# Patient Record
Sex: MALE | Race: WHITE | NOT HISPANIC OR LATINO | Employment: FULL TIME | URBAN - NONMETROPOLITAN AREA
[De-identification: names, ages, dates, MRNs, and addresses within clinical notes are randomized per-mention and may not be internally consistent; named-entity substitution may affect disease eponyms.]

---

## 2023-02-14 ENCOUNTER — OFFICE VISIT (OUTPATIENT)
Dept: NEUROLOGY | Facility: OTHER | Age: 48
End: 2023-02-14
Attending: PSYCHIATRY & NEUROLOGY
Payer: COMMERCIAL

## 2023-02-14 VITALS
RESPIRATION RATE: 18 BRPM | SYSTOLIC BLOOD PRESSURE: 128 MMHG | TEMPERATURE: 97.6 F | HEART RATE: 69 BPM | DIASTOLIC BLOOD PRESSURE: 70 MMHG | WEIGHT: 194.5 LBS | OXYGEN SATURATION: 97 %

## 2023-02-14 DIAGNOSIS — R20.0 HAND NUMBNESS: Primary | ICD-10-CM

## 2023-02-14 DIAGNOSIS — R29.898 RIGHT HAND WEAKNESS: ICD-10-CM

## 2023-02-14 PROCEDURE — G0463 HOSPITAL OUTPT CLINIC VISIT: HCPCS

## 2023-02-14 PROCEDURE — G0463 HOSPITAL OUTPT CLINIC VISIT: HCPCS | Mod: 25

## 2023-02-14 PROCEDURE — 99203 OFFICE O/P NEW LOW 30 MIN: CPT | Mod: 25 | Performed by: PSYCHIATRY & NEUROLOGY

## 2023-02-14 PROCEDURE — 95912 NRV CNDJ TEST 11-12 STUDIES: CPT | Mod: 26 | Performed by: PSYCHIATRY & NEUROLOGY

## 2023-02-14 PROCEDURE — 95886 MUSC TEST DONE W/N TEST COMP: CPT | Mod: 26 | Performed by: PSYCHIATRY & NEUROLOGY

## 2023-02-14 PROCEDURE — 95912 NRV CNDJ TEST 11-12 STUDIES: CPT | Performed by: PSYCHIATRY & NEUROLOGY

## 2023-02-14 PROCEDURE — 95886 MUSC TEST DONE W/N TEST COMP: CPT | Performed by: PSYCHIATRY & NEUROLOGY

## 2023-02-14 RX ORDER — ACETAMINOPHEN 325 MG/1
650 TABLET ORAL
COMMUNITY
Start: 2021-05-26

## 2023-02-14 RX ORDER — IBUPROFEN 600 MG/1
600 TABLET, FILM COATED ORAL
COMMUNITY
Start: 2021-04-22

## 2023-02-14 ASSESSMENT — PAIN SCALES - GENERAL: PAINLEVEL: EXTREME PAIN (8)

## 2023-02-14 NOTE — NURSING NOTE
Patient presents to clinic experiencing right elbow pain which radiates down arm into hand.    Medication Rec Complete  Sweetie Lucia LPN............2/14/2023 1:26 PM

## 2023-02-14 NOTE — LETTER
2/14/2023         RE: Cory Meeks  [address] [address]        Dear Colleague,    Thank you for referring your patient, Cory Meeks, to the [facility] [place]. Please see a copy of my visit note below.    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  He relates a lengthy history of increasing problems with severe paresthesia and weakness affecting the right hand and forearm.  He thinks he is losing strength in the hand.  His symptoms are said to be aggravated by his work, which primarily involves driving.    PAST MEDICAL HISTORY:  Seems to be unremarkable.  He had surgery at the right medial epicondyle some years ago.  He has generally been healthy.    REVIEW OF SYSTEMS:  Ten-system review of systems, other than the above, is unremarkable.    PHYSICAL EXAMINATION:  The patient is a healthy-appearing, middle-aged man.  He is 68.5 inches tall and weighs 195 pounds.  Blood pressure is 128/70.  The gait is normal.  On best effort strength is 5/5 bilaterally for the intrinsic hand muscles, finger extensors and flexors, biceps and triceps.  Reflexes are 1/4 bilaterally for the biceps, triceps, and brachioradialis tendons.  Pinprick is intact in the cervical dermatomes of the upper extremities.    NERVE CONDUCTION STUDIES:  Motor nerve conduction testing was performed for the right median, ulnar, and radial nerves.  Distal latencies, amplitudes, conduction velocities, and H reflex latencies were normal throughout.    Orthodromic mixed conduction studies were performed for the right median and ulnar nerves.  Antidromic sensory nerve conduction studies were performed for the second digital, fifth digital, and radial nerves.  Latencies, amplitudes, and conduction velocities were normal.    MONOPOLAR EMG NEEDLE EXAMINATION:  Monopolar EMG needle examination was performed  for the right first dorsal interosseous, extensor digitorum communis, flexor carpi radialis, triceps, and brachioradialis.  All of the tested muscles showed normal insertional activity.  Motor units were normal in size with normal recruitment and interference.    IMPRESSION:  The patient is neurologically healthy with respect to the right upper extremity.  There is no evidence on physical exam or neurodiagnostic testing for focal or generalized neuropathy.  There are no findings to suggest radiculopathy.    The negative results were reviewed with the patient.    Herbert Bryant MD        D: 2023   T: 2023   MT: EFRAIN    Name:     CAITLIN SANTAMARIA  MRN:      [id number]        Account:    [id number]   :      1975           Visit Date: 2023     Document: [id number]    cc:  Orlando Mesa MD       Again, thank you for allowing me to participate in the care of your patient.        Sincerely,        Herbert Bryant MD

## 2023-02-14 NOTE — PROGRESS NOTES
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  He relates a lengthy history of increasing problems with severe paresthesia and weakness affecting the right hand and forearm.  He thinks he is losing strength in the hand.  His symptoms are said to be aggravated by his work, which primarily involves driving.    PAST MEDICAL HISTORY:  Seems to be unremarkable.  He had surgery at the right medial epicondyle some years ago.  He has generally been healthy.    REVIEW OF SYSTEMS:  Ten-system review of systems, other than the above, is unremarkable.    PHYSICAL EXAMINATION:  The patient is a healthy-appearing, middle-aged man.  He is 68.5 inches tall and weighs 195 pounds.  Blood pressure is 128/70.  The gait is normal.  On best effort strength is 5/5 bilaterally for the intrinsic hand muscles, finger extensors and flexors, biceps and triceps.  Reflexes are 1/4 bilaterally for the biceps, triceps, and brachioradialis tendons.  Pinprick is intact in the cervical dermatomes of the upper extremities.    NERVE CONDUCTION STUDIES:  Motor nerve conduction testing was performed for the right median, ulnar, and radial nerves.  Distal latencies, amplitudes, conduction velocities, and H reflex latencies were normal throughout.    Orthodromic mixed conduction studies were performed for the right median and ulnar nerves.  Antidromic sensory nerve conduction studies were performed for the second digital, fifth digital, and radial nerves.  Latencies, amplitudes, and conduction velocities were normal.    MONOPOLAR EMG NEEDLE EXAMINATION:  Monopolar EMG needle examination was performed for the right first dorsal interosseous, extensor digitorum communis, flexor carpi radialis, triceps, and brachioradialis.  All of the tested muscles showed normal insertional activity.  Motor units were normal in size with normal recruitment and  interference.    IMPRESSION:  The patient is neurologically healthy with respect to the right upper extremity.  There is no evidence on physical exam or neurodiagnostic testing for focal or generalized neuropathy.  There are no findings to suggest radiculopathy.    The negative results were reviewed with the patient.    Herbert Bryant MD        D: 2023   T: 2023   MT: EFRAIN    Name:     CAITLIN SANTAMARIA  MRN:      [id number]        Account:    [id number]   :      1975           Visit Date: 2023     Document: [id number]    cc:  Orlando Mesa MD